# Patient Record
Sex: FEMALE | Race: OTHER | HISPANIC OR LATINO | Employment: PART TIME | URBAN - METROPOLITAN AREA
[De-identification: names, ages, dates, MRNs, and addresses within clinical notes are randomized per-mention and may not be internally consistent; named-entity substitution may affect disease eponyms.]

---

## 2020-01-16 ENCOUNTER — HOSPITAL ENCOUNTER (EMERGENCY)
Facility: HOSPITAL | Age: 25
Discharge: HOME | End: 2020-01-16
Attending: EMERGENCY MEDICINE | Admitting: EMERGENCY MEDICINE
Payer: COMMERCIAL

## 2020-01-16 VITALS
TEMPERATURE: 98.6 F | SYSTOLIC BLOOD PRESSURE: 131 MMHG | OXYGEN SATURATION: 100 % | HEART RATE: 65 BPM | RESPIRATION RATE: 16 BRPM | DIASTOLIC BLOOD PRESSURE: 99 MMHG | WEIGHT: 154.32 LBS

## 2020-01-16 DIAGNOSIS — N93.9 VAGINAL BLEEDING: Primary | ICD-10-CM

## 2020-01-16 LAB
BACTERIA UR QL AUTO: ABNORMAL /HPF
BASOPHILS # BLD AUTO: 0.06 THOUSANDS/ΜL (ref 0–0.1)
BASOPHILS NFR BLD AUTO: 1 % (ref 0–1)
BILIRUB UR QL STRIP: NEGATIVE
CLARITY UR: CLEAR
COLOR UR: YELLOW
COLOR, POC: YELLOW
EOSINOPHIL # BLD AUTO: 0.28 THOUSAND/ΜL (ref 0–0.61)
EOSINOPHIL NFR BLD AUTO: 3 % (ref 0–6)
ERYTHROCYTE [DISTWIDTH] IN BLOOD BY AUTOMATED COUNT: 13.4 % (ref 11.6–15.1)
EXT PREG TEST URINE: NEGATIVE
EXT. CONTROL ED NAV: NORMAL
GLUCOSE UR STRIP-MCNC: NEGATIVE MG/DL
HCT VFR BLD AUTO: 40.8 % (ref 34.8–46.1)
HGB BLD-MCNC: 12.8 G/DL (ref 11.5–15.4)
HGB UR QL STRIP.AUTO: ABNORMAL
IMM GRANULOCYTES # BLD AUTO: 0.02 THOUSAND/UL (ref 0–0.2)
IMM GRANULOCYTES NFR BLD AUTO: 0 % (ref 0–2)
KETONES UR STRIP-MCNC: NEGATIVE MG/DL
LEUKOCYTE ESTERASE UR QL STRIP: ABNORMAL
LYMPHOCYTES # BLD AUTO: 2.36 THOUSANDS/ΜL (ref 0.6–4.47)
LYMPHOCYTES NFR BLD AUTO: 25 % (ref 14–44)
MCH RBC QN AUTO: 27.8 PG (ref 26.8–34.3)
MCHC RBC AUTO-ENTMCNC: 31.4 G/DL (ref 31.4–37.4)
MCV RBC AUTO: 89 FL (ref 82–98)
MONOCYTES # BLD AUTO: 0.47 THOUSAND/ΜL (ref 0.17–1.22)
MONOCYTES NFR BLD AUTO: 5 % (ref 4–12)
NEUTROPHILS # BLD AUTO: 6.1 THOUSANDS/ΜL (ref 1.85–7.62)
NEUTS SEG NFR BLD AUTO: 66 % (ref 43–75)
NITRITE UR QL STRIP: NEGATIVE
NON-SQ EPI CELLS URNS QL MICRO: ABNORMAL /HPF
NRBC BLD AUTO-RTO: 0 /100 WBCS
PH UR STRIP.AUTO: 6 [PH] (ref 4.5–8)
PLATELET # BLD AUTO: 319 THOUSANDS/UL (ref 149–390)
PMV BLD AUTO: 10 FL (ref 8.9–12.7)
PROT UR STRIP-MCNC: NEGATIVE MG/DL
RBC # BLD AUTO: 4.61 MILLION/UL (ref 3.81–5.12)
RBC #/AREA URNS AUTO: ABNORMAL /HPF
SP GR UR STRIP.AUTO: 1.02 (ref 1–1.03)
UROBILINOGEN UR QL STRIP.AUTO: 0.2 E.U./DL
WBC # BLD AUTO: 9.29 THOUSAND/UL (ref 4.31–10.16)
WBC #/AREA URNS AUTO: ABNORMAL /HPF

## 2020-01-16 PROCEDURE — 36415 COLL VENOUS BLD VENIPUNCTURE: CPT | Performed by: EMERGENCY MEDICINE

## 2020-01-16 PROCEDURE — 81001 URINALYSIS AUTO W/SCOPE: CPT

## 2020-01-16 PROCEDURE — 81025 URINE PREGNANCY TEST: CPT | Performed by: EMERGENCY MEDICINE

## 2020-01-16 PROCEDURE — 99282 EMERGENCY DEPT VISIT SF MDM: CPT | Performed by: EMERGENCY MEDICINE

## 2020-01-16 PROCEDURE — 85025 COMPLETE CBC W/AUTO DIFF WBC: CPT | Performed by: EMERGENCY MEDICINE

## 2020-01-16 PROCEDURE — 99284 EMERGENCY DEPT VISIT MOD MDM: CPT

## 2020-01-16 NOTE — ED PROVIDER NOTES
History  Chief Complaint   Patient presents with    Vaginal Bleeding     Pt  reports having her period for the past twenty days  Pt  reports lower abdominal pain  Pt  reports two episodes of vomitting two days ago  History provided by:  Patient   used: No    Vaginal Bleeding   Quality:  Dark red  Severity:  Moderate  Onset quality:  Gradual  Duration:  20 days  Timing:  Intermittent  Progression:  Unchanged  Chronicity:  New  Menstrual history:  Irregular  Number of pads used:  3 per day  Possible pregnancy: no    Context: spontaneously    Relieved by:  Nothing  Worsened by:  Nothing  Ineffective treatments:  None tried  Associated symptoms: abdominal pain    Associated symptoms: no back pain, no dizziness, no dyspareunia, no dysuria, no fever and no nausea    Abdominal pain:     Location:  Suprapubic    Quality: cramping      Severity:  Mild    Onset quality:  Gradual    Duration:  20 days    Timing:  Intermittent    Progression:  Waxing and waning    Chronicity:  New  Risk factors: no bleeding disorder        Prior to Admission Medications   Prescriptions Last Dose Informant Patient Reported? Taking?   etonogestrel (NEXPLANON) subdermal implant   Yes Yes   Si mg by Subdermal route once      Facility-Administered Medications: None       Past Medical History:   Diagnosis Date    Asthma        Past Surgical History:   Procedure Laterality Date     SECTION      HIP SURGERY         History reviewed  No pertinent family history  I have reviewed and agree with the history as documented  Social History     Tobacco Use    Smoking status: Current Every Day Smoker    Smokeless tobacco: Never Used   Substance Use Topics    Alcohol use: Not Currently    Drug use: Not Currently        Review of Systems   Constitutional: Negative for activity change, chills and fever  HENT: Negative for facial swelling, sore throat and trouble swallowing      Eyes: Negative for pain and visual disturbance  Respiratory: Negative for cough, chest tightness and shortness of breath  Cardiovascular: Negative for chest pain and leg swelling  Gastrointestinal: Positive for abdominal pain  Negative for blood in stool, diarrhea, nausea and vomiting  Genitourinary: Positive for vaginal bleeding  Negative for dyspareunia, dysuria and flank pain  Musculoskeletal: Negative for back pain, neck pain and neck stiffness  Skin: Negative for pallor and rash  Allergic/Immunologic: Negative for environmental allergies and immunocompromised state  Neurological: Negative for dizziness and headaches  Hematological: Negative for adenopathy  Does not bruise/bleed easily  Psychiatric/Behavioral: Negative for agitation and behavioral problems  All other systems reviewed and are negative  Physical Exam  Physical Exam   Constitutional: She is oriented to person, place, and time  She appears well-developed and well-nourished  No distress  HENT:   Head: Normocephalic and atraumatic  Eyes: EOM are normal    Neck: Normal range of motion  Neck supple  Cardiovascular: Normal rate, regular rhythm, normal heart sounds and intact distal pulses  Pulmonary/Chest: Effort normal and breath sounds normal    Abdominal: Soft  Bowel sounds are normal  There is no tenderness  There is no rebound and no guarding  Musculoskeletal: Normal range of motion  Neurological: She is alert and oriented to person, place, and time  Skin: Skin is warm and dry  Psychiatric: She has a normal mood and affect  Nursing note and vitals reviewed        Vital Signs  ED Triage Vitals [01/16/20 1627]   Temperature Pulse Respirations Blood Pressure SpO2   98 6 °F (37 °C) 65 18 119/68 98 %      Temp Source Heart Rate Source Patient Position - Orthostatic VS BP Location FiO2 (%)   Temporal Monitor Sitting Right arm --      Pain Score       6           Vitals:    01/16/20 1627 01/16/20 1750   BP: 119/68 131/99   Pulse: 65 65   Patient Position - Orthostatic VS: Sitting Sitting         Visual Acuity      ED Medications  Medications - No data to display    Diagnostic Studies  Results Reviewed     Procedure Component Value Units Date/Time    CBC and differential [[id number]] Collected:  01/16/20 1749    Lab Status:  Final result Specimen:  Blood from Arm, Left Updated:  01/16/20 1755     WBC 9 29 Thousand/uL      RBC 4 61 Million/uL      Hemoglobin 12 8 g/dL      Hematocrit 40 8 %      MCV 89 fL      MCH 27 8 pg      MCHC 31 4 g/dL      RDW 13 4 %      MPV 10 0 fL      Platelets 035 Thousands/uL      nRBC 0 /100 WBCs      Neutrophils Relative 66 %      Immat GRANS % 0 %      Lymphocytes Relative 25 %      Monocytes Relative 5 %      Eosinophils Relative 3 %      Basophils Relative 1 %      Neutrophils Absolute 6 10 Thousands/µL      Immature Grans Absolute 0 02 Thousand/uL      Lymphocytes Absolute 2 36 Thousands/µL      Monocytes Absolute 0 47 Thousand/µL      Eosinophils Absolute 0 28 Thousand/µL      Basophils Absolute 0 06 Thousands/µL     Urine Microscopic [[id number]]  (Abnormal) Collected:  01/16/20 1714    Lab Status:  Final result Specimen:  Urine, Clean Catch Updated:  01/16/20 1749     RBC, UA 30-50 /hpf      WBC, UA 2-4 /hpf      Epithelial Cells Occasional /hpf      Bacteria, UA Occasional /hpf     POCT urinalysis dipstick [[id number]]  (Abnormal) Resulted:  01/16/20 1717    Lab Status:  Final result Specimen:  Urine Updated:  01/16/20 1717     Color, UA yellow    POCT pregnancy, urine [[id number]]  (Normal) Resulted:  01/16/20 1717    Lab Status:  Final result Updated:  01/16/20 1717     EXT PREG TEST UR (Ref: Negative) negative     Control valid    Urine Macroscopic, POC [[id number]]  (Abnormal) Collected:  01/16/20 1714    Lab Status:  Final result Specimen:  Urine Updated:  01/16/20 1715     Color, UA Yellow     Clarity, UA Clear     pH, UA 6 0     Leukocytes, UA Small     Nitrite, UA Negative     Protein, UA Negative mg/dl      Glucose, UA Negative mg/dl      Ketones, UA Negative mg/dl      Urobilinogen, UA 0 2 E U /dl      Bilirubin, UA Negative     Blood, UA Large     Specific Gravity, UA 1 025    Narrative:       CLINITEK RESULT                 No orders to display              Procedures  Procedures         ED Course  ED Course as of Jan 16 1812   Thu Jan 16, 2020   1754 Leukocytes, UA(!): Small   1754 Nitrite, UA: Negative   1754 RBC, UA(!): 30-50   1754 WBC, UA(!): 2-4   1754 Urine results reviewed, RBC noted, no suggestive of infection  PREGNANCY TEST URINE: negative   1809 Hemoglobin stable noted  Hemoglobin: 12 8   1809 Stable for discharge, will give [facility] information for outpatient follow-up  MDM  Number of Diagnoses or Management Options  Diagnosis management comments: Patient is a 66-year-old healthy female, comes in with complaints of vaginal bleeding going on for about 20 days, uses 3 pads per day, also reports mild vague bilateral lower abdominal/suprapubic cramping denies dizziness, syncope, chest pain or dyspnea  Patient has birth control implant which she wants to be removed  On exam patient is well-appearing, no acute distress, vital signs stable, abdomen is soft nontender  The patient informed that birth control implant would have to be taken out at the gyn office however we will check CBC to make sure she is not anemic from vaginal bleeding  Patient is okay with plan  Hemoglobin checked stable, discharge, follow-up with gyn         Amount and/or Complexity of Data Reviewed  Clinical lab tests: ordered and reviewed          Disposition  Final diagnoses:   Vaginal bleeding     Time reflects when diagnosis was documented in both MDM as applicable and the Disposition within this note     Time User Action Codes Description Comment    1/16/2020  6:12 PM Severiano Cobb Add [N93 9] Vaginal bleeding       ED Disposition     ED Disposition Condition Date/Time Comment    Discharge Stable Thu Jan 16, 2020  6:12 PM Laura 36 discharge to home/self care  Follow-up Information     Follow up With Specialties Details Why Contact Info Additional [address] [facility] Obstetrics and Gynecology Schedule an appointment as soon as possible for a visit   [address], [address] [facility] [address]  [facility], [address], [address] [place], [place], [address] [phone]          Patient's Medications   Discharge Prescriptions    No medications on file     No discharge procedures on file      ED Provider  Electronically Signed by           Samantha Brennan MD  01/16/20 2410

## 2020-01-20 ENCOUNTER — HOSPITAL ENCOUNTER (EMERGENCY)
Facility: HOSPITAL | Age: 25
Discharge: HOME | End: 2020-01-20
Attending: EMERGENCY MEDICINE | Admitting: EMERGENCY MEDICINE
Payer: COMMERCIAL

## 2020-01-20 VITALS
RESPIRATION RATE: 18 BRPM | WEIGHT: 165.34 LBS | TEMPERATURE: 98.5 F | SYSTOLIC BLOOD PRESSURE: 115 MMHG | HEART RATE: 74 BPM | OXYGEN SATURATION: 100 % | DIASTOLIC BLOOD PRESSURE: 69 MMHG

## 2020-01-20 DIAGNOSIS — M54.6 THORACIC BACK PAIN: Primary | ICD-10-CM

## 2020-01-20 LAB
EXT PREG TEST URINE: NEGATIVE
EXT. CONTROL ED NAV: NORMAL

## 2020-01-20 PROCEDURE — 99284 EMERGENCY DEPT VISIT MOD MDM: CPT | Performed by: PHYSICIAN ASSISTANT

## 2020-01-20 PROCEDURE — 96372 THER/PROPH/DIAG INJ SC/IM: CPT

## 2020-01-20 PROCEDURE — 99283 EMERGENCY DEPT VISIT LOW MDM: CPT

## 2020-01-20 PROCEDURE — 81025 URINE PREGNANCY TEST: CPT | Performed by: PHYSICIAN ASSISTANT

## 2020-01-20 RX ORDER — ACETAMINOPHEN 325 MG/1
650 TABLET ORAL ONCE
Status: COMPLETED | OUTPATIENT
Start: 2020-01-20 | End: 2020-01-20

## 2020-01-20 RX ORDER — KETOROLAC TROMETHAMINE 30 MG/ML
15 INJECTION, SOLUTION INTRAMUSCULAR; INTRAVENOUS ONCE
Status: COMPLETED | OUTPATIENT
Start: 2020-01-20 | End: 2020-01-20

## 2020-01-20 RX ORDER — NAPROXEN 500 MG/1
500 TABLET ORAL 2 TIMES DAILY PRN
Qty: 30 TABLET | Refills: 0 | Status: SHIPPED | OUTPATIENT
Start: 2020-01-20

## 2020-01-20 RX ORDER — METHOCARBAMOL 500 MG/1
500 TABLET, FILM COATED ORAL 2 TIMES DAILY PRN
Qty: 20 TABLET | Refills: 0 | Status: SHIPPED | OUTPATIENT
Start: 2020-01-20

## 2020-01-20 RX ADMIN — ACETAMINOPHEN 650 MG: 325 TABLET ORAL at 12:39

## 2020-01-20 RX ADMIN — KETOROLAC TROMETHAMINE 15 MG: 30 INJECTION, SOLUTION INTRAMUSCULAR at 12:38

## 2020-01-20 NOTE — ED PROVIDER NOTES
History  Chief Complaint   Patient presents with    Back Pain     Pt  reports upper back pain  Pt  reports hx of back pain  Pt  reports has a high pain tolerance  Pt  reports in [place] they give her " Percs and muscle relaxers"  Pt  reports low dose "stuff aint work for me"  Aida Collins is a 24 yo F presenting with exacerbation of chronic back pain for the past two days  Pt states she has previous diagnosis of sciatica as well as frequent mid-upper back pain which has been ongoing for "years"  States back pain is located over R upper back and worsens with movement and with palpation  Has taken tylenol at home as needed without significant relief  No meds PTA today for pain  Denies chest pain, cough, or SOB  Denies worsening of pain with deep inspiration  History provided by:  Patient   used: No    Back Pain   Location:  Thoracic spine  Quality:  Aching and cramping  Radiates to:  Does not radiate  Duration:  2 days  Timing:  Constant  Progression:  Waxing and waning  Chronicity:  Recurrent  Context: not falling, not lifting heavy objects and not recent injury    Relieved by:  Nothing  Worsened by:  Bending, twisting and touching  Ineffective treatments: tylenol  Associated symptoms: no abdominal pain, no bladder incontinence, no bowel incontinence, no chest pain, no fever, no headaches, no leg pain, no numbness and no weakness        Prior to Admission Medications   Prescriptions Last Dose Informant Patient Reported? Taking?   etonogestrel (NEXPLANON) subdermal implant   Yes No   Si mg by Subdermal route once      Facility-Administered Medications: None       Past Medical History:   Diagnosis Date    Asthma        Past Surgical History:   Procedure Laterality Date     SECTION      HIP SURGERY         History reviewed  No pertinent family history  I have reviewed and agree with the history as documented      Social History     Tobacco Use    Smoking status: Current Every Day Smoker    Smokeless tobacco: Never Used   Substance Use Topics    Alcohol use: Not Currently    Drug use: Not Currently        Review of Systems   Constitutional: Negative for activity change, chills and fever  HENT: Negative for congestion, rhinorrhea and sore throat  Eyes: Negative for photophobia and visual disturbance  Respiratory: Negative for cough, chest tightness, shortness of breath and wheezing  Cardiovascular: Negative for chest pain and palpitations  Gastrointestinal: Negative for abdominal pain, bowel incontinence, constipation, diarrhea, nausea and vomiting  Genitourinary: Negative for bladder incontinence, difficulty urinating, enuresis, flank pain, frequency, hematuria and urgency  Musculoskeletal: Positive for back pain  Negative for gait problem, neck pain and neck stiffness  Skin: Negative for rash and wound  Neurological: Negative for dizziness, tremors, speech difficulty, weakness, light-headedness, numbness and headaches  No loss of bowel/bladder control       Physical Exam  Physical Exam   Constitutional: She is oriented to person, place, and time  She appears well-developed and well-nourished  No distress  HENT:   Head: Normocephalic and atraumatic  Right Ear: External ear normal    Left Ear: External ear normal    Nose: Nose normal    Mouth/Throat: Oropharynx is clear and moist  No oropharyngeal exudate  Eyes: Pupils are equal, round, and reactive to light  Conjunctivae and EOM are normal    Neck: Normal range of motion  Neck supple  Cardiovascular: Normal rate, regular rhythm, normal heart sounds and intact distal pulses  Exam reveals no gallop and no friction rub  No murmur heard  Pulmonary/Chest: Effort normal and breath sounds normal  No respiratory distress  She has no wheezes  She has no rales  Abdominal: Soft  She exhibits no distension and no mass  There is no tenderness  There is no guarding  Musculoskeletal: She exhibits tenderness  She exhibits no edema or deformity  Arms:  TTP to R thoracic paraspinal musculature  Pain elicited with movement of R shoulder  Lymphadenopathy:     She has no cervical adenopathy  Neurological: She is alert and oriented to person, place, and time  She displays normal reflexes  No sensory deficit  She exhibits normal muscle tone  Coordination normal    Skin: Skin is warm and dry  Capillary refill takes less than 2 seconds  No rash noted  She is not diaphoretic  No erythema  No pallor  Psychiatric: She has a normal mood and affect  Her behavior is normal  Judgment and thought content normal    Nursing note and vitals reviewed  Vital Signs  ED Triage Vitals [01/20/20 1148]   Temperature Pulse Respirations Blood Pressure SpO2   98 5 °F (36 9 °C) 74 18 115/69 100 %      Temp Source Heart Rate Source Patient Position - Orthostatic VS BP Location FiO2 (%)   Temporal Monitor Sitting Right arm --      Pain Score       6           Vitals:    01/20/20 1148   BP: 115/69   Pulse: 74   Patient Position - Orthostatic VS: Sitting         Visual Acuity      ED Medications  Medications   ketorolac (TORADOL) injection 15 mg (15 mg Intramuscular Given 1/20/20 1238)   acetaminophen (TYLENOL) tablet 650 mg (650 mg Oral Given 1/20/20 1239)       Diagnostic Studies  Results Reviewed     Procedure Component Value Units Date/Time    POCT pregnancy, urine [[id number]]  (Normal) Resulted:  01/20/20 1229    Lab Status:  Final result Updated:  01/20/20 1230     EXT PREG TEST UR (Ref: Negative) negative     Control valid                 No orders to display              Procedures  Procedures         ED Course                               MDM  Number of Diagnoses or Management Options  Thoracic back pain:   Diagnosis management comments: R sided upper back pain ongoing for 2 days, prior history of similar  No recent injury/trauma  Muscular TTP and pain worsens with movement of R shoulder  No chest pain, cough, or SOB   Will treat supportively with naproxen, robaxin PRN  Follow up advised with orthopedics  Strict return indications discussed  Patient Progress  Patient progress: stable        Disposition  Final diagnoses:   Thoracic back pain     Time reflects when diagnosis was documented in both MDM as applicable and the Disposition within this note     Time User Action Codes Description Comment    1/20/2020  1:10 PM Ples Must Add [M54 6] Thoracic back pain       ED Disposition     ED Disposition Condition Date/Time Comment    Discharge Stable Mon Jan 20, 2020  1:09 PM Laura 36 discharge to home/self care  Follow-up Information     Follow up With Specialties Details Why Contact Info Additional [address] [facility] Emergency Department Emergency Medicine  If symptoms worsen [facility] [address]  [phone] [place] ED, [address] [facility] , [place], [place], [address] [facility] 25 Orthopedic Surgery Schedule an appointment as soon as possible for a visit   [address]  [place] [address] [facility] [address]  [address] [place] [address], [address] [facility], [address] [place], [place], [id number] [phone]          Patient's Medications   Discharge Prescriptions    METHOCARBAMOL (ROBAXIN) 500 MG TABLET    Take 1 tablet (500 mg total) by mouth 2 (two) times a day as needed for muscle spasms       Start Date: 1/20/2020 End Date: --       Order Dose: 500 mg       Quantity: 20 tablet    Refills: 0    NAPROXEN (NAPROSYN) 500 MG TABLET    Take 1 tablet (500 mg total) by mouth 2 (two) times a day as needed for mild pain or moderate pain       Start Date: 1/20/2020 End Date: --       Order Dose: 500 mg       Quantity: 30 tablet    Refills: 0     No discharge procedures on file      ED Provider  Electronically Signed by           Veda Colbert PA-C  01/20/20 9788

## 2020-01-21 ENCOUNTER — TELEPHONE (OUTPATIENT)
Dept: PAIN MEDICINE | Facility: MEDICAL CENTER | Age: 25
End: 2020-01-21

## 2020-01-21 ENCOUNTER — HOSPITAL ENCOUNTER (EMERGENCY)
Facility: HOSPITAL | Age: 25
Discharge: HOME | End: 2020-01-21
Attending: EMERGENCY MEDICINE
Payer: COMMERCIAL

## 2020-01-21 VITALS
OXYGEN SATURATION: 100 % | HEART RATE: 54 BPM | SYSTOLIC BLOOD PRESSURE: 118 MMHG | RESPIRATION RATE: 14 BRPM | TEMPERATURE: 98.3 F | WEIGHT: 165.34 LBS | DIASTOLIC BLOOD PRESSURE: 69 MMHG

## 2020-01-21 DIAGNOSIS — G89.29 CHRONIC RIGHT-SIDED THORACIC BACK PAIN: Primary | ICD-10-CM

## 2020-01-21 DIAGNOSIS — M54.6 CHRONIC RIGHT-SIDED THORACIC BACK PAIN: Primary | ICD-10-CM

## 2020-01-21 PROCEDURE — 99283 EMERGENCY DEPT VISIT LOW MDM: CPT

## 2020-01-21 PROCEDURE — 99282 EMERGENCY DEPT VISIT SF MDM: CPT | Performed by: PHYSICIAN ASSISTANT

## 2020-01-21 RX ORDER — METHYLPREDNISOLONE 4 MG/1
TABLET ORAL
Qty: 21 TABLET | Refills: 0 | Status: SHIPPED | OUTPATIENT
Start: 2020-01-21

## 2020-01-21 RX ORDER — LIDOCAINE 50 MG/G
1 PATCH TOPICAL ONCE
Status: DISCONTINUED | OUTPATIENT
Start: 2020-01-21 | End: 2020-01-21 | Stop reason: HOSPADM

## 2020-01-21 RX ADMIN — LIDOCAINE 1 PATCH: 50 PATCH TOPICAL at 08:52

## 2020-01-21 NOTE — TELEPHONE ENCOUNTER
Patient called requesting to schedule S/P consult  Patient has [employer] & she is in the process of getting PA medical insurance  She also stated she has seen prior pain specialist in [facility] a year & a half ago  I attempted to start intake, but she didn't have any of her information at time of call  She stated she will call us back with this information

## 2020-01-21 NOTE — ED PROVIDER NOTES
History  Chief Complaint   Patient presents with    Back Pain     b/l shoulder blade pain that started on thursday pt states she was seen yesterday was given naproxen and muscle relaxant  denies injury or fall  pt states "i have a high tolerance for pain thats why i am here i need help:"     25year old female PMH chronic back pain presents today complaining of constant back pain for weeks  Denies preceding injury  Was diagnosed with sciatica in the past and has had multiple epidurals and she feels that her pain is attributed to this  The pain is worse with movement  Was seen yesterday for same and discharged with robaxin and naproxen, states that she has a very high pain tolerance and any medications that are given to her need to be the highest dose since nothing works for her  She denies bowel or bladder incontinence, denies chest pain, SOB, abd pain, nausea, vomiting, diarrhea  Prior to Admission Medications   Prescriptions Last Dose Informant Patient Reported? Taking?   etonogestrel (NEXPLANON) subdermal implant   Yes No   Si mg by Subdermal route once   methocarbamol (ROBAXIN) 500 mg tablet   No No   Sig: Take 1 tablet (500 mg total) by mouth 2 (two) times a day as needed for muscle spasms   naproxen (NAPROSYN) 500 mg tablet   No No   Sig: Take 1 tablet (500 mg total) by mouth 2 (two) times a day as needed for mild pain or moderate pain      Facility-Administered Medications: None       Past Medical History:   Diagnosis Date    Asthma        Past Surgical History:   Procedure Laterality Date     SECTION      HIP SURGERY         History reviewed  No pertinent family history  I have reviewed and agree with the history as documented      Social History     Tobacco Use    Smoking status: Current Every Day Smoker    Smokeless tobacco: Never Used   Substance Use Topics    Alcohol use: Not Currently    Drug use: Not Currently        Review of Systems   Musculoskeletal: Positive for back pain    All other systems reviewed and are negative  Physical Exam  Physical Exam   Constitutional: She is oriented to person, place, and time  She appears well-developed and well-nourished  No distress  HENT:   Head: Normocephalic and atraumatic  Mouth/Throat: Oropharynx is clear and moist    Eyes: Conjunctivae and EOM are normal    Neck: Normal range of motion  Neck supple  Cardiovascular: Normal rate and regular rhythm  Pulmonary/Chest: Effort normal and breath sounds normal  No stridor  No respiratory distress  She has no wheezes  Abdominal: Soft  Bowel sounds are normal  She exhibits no distension  There is no tenderness  There is no guarding  Musculoskeletal:        Thoracic back: She exhibits decreased range of motion and tenderness  She exhibits no bony tenderness, no swelling, no edema, no deformity, no laceration, no pain and no spasm  Back:    Lymphadenopathy:     She has no cervical adenopathy  Neurological: She is alert and oriented to person, place, and time  She displays normal reflexes  No cranial nerve deficit  Coordination normal    Skin: Skin is warm and dry  Capillary refill takes less than 2 seconds  No rash noted  She is not diaphoretic  No erythema         Vital Signs  ED Triage Vitals [01/21/20 0823]   Temperature Pulse Respirations Blood Pressure SpO2   98 3 °F (36 8 °C) (!) 54 14 118/69 100 %      Temp Source Heart Rate Source Patient Position - Orthostatic VS BP Location FiO2 (%)   Oral Monitor Sitting Right arm --      Pain Score       8           Vitals:    01/21/20 0823   BP: 118/69   Pulse: (!) 54   Patient Position - Orthostatic VS: Sitting         Visual Acuity      ED Medications  Medications - No data to display    Diagnostic Studies  Results Reviewed     None                 No orders to display              Procedures  Procedures         ED Course                               MDM      Disposition  Final diagnoses:   Chronic right-sided thoracic back pain Time reflects when diagnosis was documented in both MDM as applicable and the Disposition within this note     Time User Action Codes Description Comment    1/21/2020  8:47 AM Shaquille Xiao Add [M54 6,  G89 29] Chronic right-sided thoracic back pain       ED Disposition     ED Disposition Condition Date/Time Comment    Discharge Stable Tue Jan 21, 2020  8:47 AM Kristina Sandoval discharge to home/self care  Follow-up Information     Follow up With Specialties Details Why Contact Info Additional Information    [facility]   [address], [place] [address] [facility]  [phone] [address] [facility], [address], [address] [facility], [place], [place], [address]          Discharge Medication List as of 1/21/2020  8:48 AM      START taking these medications    Details   methylPREDNISolone 4 MG tablet therapy pack Use as directed on package, Print         CONTINUE these medications which have NOT CHANGED    Details   etonogestrel (NEXPLANON) subdermal implant 68 mg by Subdermal route once, Historical Med      methocarbamol (ROBAXIN) 500 mg tablet Take 1 tablet (500 mg total) by mouth 2 (two) times a day as needed for muscle spasms, Starting Mon 1/20/2020, Normal      naproxen (NAPROSYN) 500 mg tablet Take 1 tablet (500 mg total) by mouth 2 (two) times a day as needed for mild pain or moderate pain, Starting Mon 1/20/2020, Normal           No discharge procedures on file      ED Provider  Electronically Signed by           Justice Muñoz PA-C  01/26/20 9284